# Patient Record
Sex: FEMALE | Race: WHITE | NOT HISPANIC OR LATINO | Employment: UNEMPLOYED | URBAN - METROPOLITAN AREA
[De-identification: names, ages, dates, MRNs, and addresses within clinical notes are randomized per-mention and may not be internally consistent; named-entity substitution may affect disease eponyms.]

---

## 2018-06-22 ENCOUNTER — TELEPHONE (OUTPATIENT)
Dept: FAMILY MEDICINE CLINIC | Facility: CLINIC | Age: 63
End: 2018-06-22

## 2018-10-10 ENCOUNTER — OFFICE VISIT (OUTPATIENT)
Dept: FAMILY MEDICINE CLINIC | Facility: CLINIC | Age: 63
End: 2018-10-10
Payer: COMMERCIAL

## 2018-10-10 VITALS
BODY MASS INDEX: 19.45 KG/M2 | HEIGHT: 61 IN | HEART RATE: 93 BPM | RESPIRATION RATE: 16 BRPM | DIASTOLIC BLOOD PRESSURE: 80 MMHG | SYSTOLIC BLOOD PRESSURE: 120 MMHG | WEIGHT: 103 LBS | OXYGEN SATURATION: 95 %

## 2018-10-10 DIAGNOSIS — Z23 NEED FOR INFLUENZA VACCINATION: ICD-10-CM

## 2018-10-10 DIAGNOSIS — F17.210 LIGHT CIGARETTE SMOKER: ICD-10-CM

## 2018-10-10 DIAGNOSIS — Z53.20 COLONOSCOPY REFUSED: ICD-10-CM

## 2018-10-10 DIAGNOSIS — Z00.01 ENCOUNTER FOR ROUTINE ADULT HEALTH EXAMINATION WITH ABNORMAL FINDINGS: Primary | ICD-10-CM

## 2018-10-10 DIAGNOSIS — E78.2 MIXED HYPERLIPIDEMIA: ICD-10-CM

## 2018-10-10 DIAGNOSIS — E03.9 ACQUIRED HYPOTHYROIDISM: ICD-10-CM

## 2018-10-10 DIAGNOSIS — E55.9 VITAMIN D DEFICIENCY: ICD-10-CM

## 2018-10-10 PROCEDURE — 99396 PREV VISIT EST AGE 40-64: CPT | Performed by: FAMILY MEDICINE

## 2018-10-10 PROCEDURE — 3725F SCREEN DEPRESSION PERFORMED: CPT | Performed by: FAMILY MEDICINE

## 2018-10-10 RX ORDER — CHOLECALCIFEROL (VITAMIN D3) 125 MCG
CAPSULE ORAL DAILY
COMMUNITY
Start: 2018-05-22

## 2018-10-10 NOTE — PATIENT INSTRUCTIONS

## 2018-10-10 NOTE — PROGRESS NOTES
[facility] HEALTH MAINTENANCE OFFICE VISIT  [facility]   SHAYE Palmer    NAME: Guille Greene  AGE: 61 y o  SEX: female  : 1955     DATE: 10/10/2018    Assessment and Plan     Problem List Items Addressed This Visit     Acquired hypothyroidism    Relevant Orders    Comprehensive metabolic panel    CBC and differential    Lipid panel    TSH, 3rd generation with Free T4 reflex    Light cigarette smoker     Chronic patient smoked the for more than 30 years half to 1 pack a day and smoking cessation discussed with the patient and she declined any assistant to help her to stop smoking         Mixed hyperlipidemia     Chronic ,fair control on current diet    Advised to maintain a low-fat low-cholesterol diet consult regarding potential comorbidity including cardiovascular disease consult regarding important weight loss           Relevant Orders    Comprehensive metabolic panel    CBC and differential    Lipid panel    TSH, 3rd generation with Free T4 reflex    Vitamin D deficiency     Chronic asymptomatic patient will continue with vitamin-D supplement 2000 International Units once a day         Relevant Orders    Comprehensive metabolic panel    CBC and differential    Lipid panel    TSH, 3rd generation with Free T4 reflex      Other Visit Diagnoses     Encounter for routine adult health examination with abnormal findings    -  Primary    Increased activity 30 min a day 5 day with 6 week healthy diet well hydration sunscreen discussed with the patient, she declineShingirix    Need for influenza vaccination        Patient declined flu shot    Relevant Orders    influenza vaccine, [id number], quadrivalent, recombinant, PF, 0 5 mL, for patients 18 yr+ (FLUBLOK)    Colonoscopy refused        Patient declined colonoscopy and she declined to have Hemoccult stool card            · Patient Counseling:   · Nutrition: Stressed importance of a well balanced diet, moderation of sodium/saturated fat, caloric balance and sufficient intake of fiber  · Exercise: Stressed the importance of regular exercise with a goal of 150 minutes per week  · Dental Health: Discussed daily flossing and brushing and regular dental visits     · Immunizations reviewed Patient declined flu shot declined  Tdap  · Discussed benefits of screening   Patient declined a mammogram patient declined colonoscopy  · Discussed the patient's BMI with her    The BMI is in the acceptable range           Chief Complaint     Chief Complaint   Patient presents with    Physical Exam     yearly        History of Present Illness     Patient is here for her annual exam deny any chest pain short of breath no palpitation no headache no blurred vision no weakness or lateralized of the symptom no abdomen pain no nausea vomiting or diarrhea no renal problem no rash no fever no change in the weight and no change in the mood the patient the she does not do exercise she does watch for which he eat she is smoker half to 1 pack a day for more than 30y ,refuse mammogram/colonoscopy        Well Adult Physical   Patient here for a comprehensive physical exam       Diet and Physical Activity  Diet: well balanced diet  Weight concerns: None, patient's BMI is between 18 5-24 9  Exercise: never      Depression Screen  PHQ-9 Depression Screening    PHQ-9:    Frequency of the following problems over the past two weeks:       Little interest or pleasure in doing things:  0 - not at all  Feeling down, depressed, or hopeless:  0 - not at all  PHQ-2 Score:  0          General Health  Hearing: Normal:  bilateral  Vision:  Patient does have vision problem follow with Ophthalmology  Dental: regular dental visits    Reproductive Health   patient is due for Pap smear was schedule her for appointment    The following portions of the patient's history were reviewed and updated as appropriate: allergies, current medications, past family history, past medical history, past social history, past surgical history and problem list     Review of Systems     Review of Systems   Constitutional: Negative for fatigue and fever  HENT: Negative for ear pain, sinus pain, sinus pressure and sore throat  Eyes: Negative for pain and redness  Respiratory: Negative for cough, chest tightness and shortness of breath  Cardiovascular: Negative for chest pain, palpitations and leg swelling  Gastrointestinal: Negative for abdominal pain, blood in stool, constipation, diarrhea and nausea  Genitourinary: Negative for flank pain, frequency and hematuria  Musculoskeletal: Negative for back pain and joint swelling  Skin: Negative for rash  Neurological: Negative for dizziness, numbness and headaches  Hematological: Does not bruise/bleed easily  Psychiatric/Behavioral: Negative for agitation and behavioral problems  Past Medical History     Past Medical History:   Diagnosis Date    Wrist fracture, right        Past Surgical History     Past Surgical History:   Procedure Laterality Date     SECTION      x2    TONSILLECTOMY         Social History     Social History     Social History    Marital status:      Spouse name: N/A    Number of children: N/A    Years of education: N/A     Social History Main Topics    Smoking status: Current Every Day Smoker     Packs/day: 0 25     Years: 10 00     Types: Cigarettes    Smokeless tobacco: Never Used      Comment: passive smoke exposure    Alcohol use Yes      Comment: occasional     Drug use: Unknown    Sexual activity: Not Asked     Other Topics Concern    None     Social History Narrative    None       Family History     History reviewed  No pertinent family history      Current Medications       Current Outpatient Prescriptions:     Cholecalciferol (VITAMIN D3) 2000 units TABS, daily, Disp: , Rfl:      Allergies     Allergies   Allergen Reactions    Codeine Anaphylaxis       Objective /80 (BP Location: Left arm, Patient Position: Sitting, Cuff Size: Standard)   Pulse 93   Resp 16   Ht 5' 1" (1 549 m)   Wt 46 7 kg (103 lb)   SpO2 95%   BMI 19 46 kg/m²      Physical Exam   Constitutional: She is oriented to person, place, and time  She appears well-developed and well-nourished  HENT:   Head: Normocephalic  Right Ear: External ear normal    Left Ear: External ear normal    Eyes: Conjunctivae and EOM are normal  Right eye exhibits no discharge  Left eye exhibits no discharge  Neck: No JVD present  Cardiovascular: Normal rate, regular rhythm and normal heart sounds  Exam reveals no gallop  No murmur heard  Pulmonary/Chest: Effort normal  No respiratory distress  She has no wheezes  She has no rales  She exhibits no tenderness  Abdominal: She exhibits no mass  There is no tenderness  There is no rebound  Musculoskeletal: She exhibits no edema or tenderness  Neurological: She is alert and oriented to person, place, and time  Skin: No rash noted  No erythema  Health Maintenance     Health Maintenance   Topic Date Due    Pneumococcal PPSV23 Medium Risk Adult (1 of 1 - PPSV23) 01/10/1974    DTaP,Tdap,and Td Vaccines (1 - Tdap) 01/10/1976    INFLUENZA VACCINE  07/01/2018    Depression Screening PHQ  10/10/2019     There is no immunization history for the selected administration types on file for this patient      Estephania Clark MD  [address] [place] [address]

## 2018-10-11 NOTE — ASSESSMENT & PLAN NOTE
Chronic ,fair control on current diet    Advised to maintain a low-fat low-cholesterol diet consult regarding potential comorbidity including cardiovascular disease consult regarding important weight loss

## 2018-10-11 NOTE — ASSESSMENT & PLAN NOTE
Chronic patient smoked the for more than 30 years half to 1 pack a day and smoking cessation discussed with the patient and she declined any assistant to help her to stop smoking

## 2018-10-11 NOTE — ASSESSMENT & PLAN NOTE
Chronic asymptomatic patient will continue with vitamin-D supplement 2000 International Units once a day

## 2019-02-15 ENCOUNTER — OFFICE VISIT (OUTPATIENT)
Dept: FAMILY MEDICINE CLINIC | Facility: CLINIC | Age: 64
End: 2019-02-15
Payer: COMMERCIAL

## 2019-02-15 VITALS
BODY MASS INDEX: 19.69 KG/M2 | HEIGHT: 62 IN | WEIGHT: 107 LBS | SYSTOLIC BLOOD PRESSURE: 136 MMHG | DIASTOLIC BLOOD PRESSURE: 90 MMHG | TEMPERATURE: 97.9 F | HEART RATE: 79 BPM | OXYGEN SATURATION: 95 %

## 2019-02-15 DIAGNOSIS — E87.5 HYPERKALEMIA: ICD-10-CM

## 2019-02-15 DIAGNOSIS — Z12.11 SCREENING FOR MALIGNANT NEOPLASM OF COLON: ICD-10-CM

## 2019-02-15 DIAGNOSIS — E55.9 VITAMIN D DEFICIENCY: ICD-10-CM

## 2019-02-15 DIAGNOSIS — Z11.59 NEED FOR HEPATITIS C SCREENING TEST: ICD-10-CM

## 2019-02-15 DIAGNOSIS — E03.9 ACQUIRED HYPOTHYROIDISM: ICD-10-CM

## 2019-02-15 DIAGNOSIS — E87.6 HYPOKALEMIA: ICD-10-CM

## 2019-02-15 DIAGNOSIS — E78.2 MIXED HYPERLIPIDEMIA: Primary | ICD-10-CM

## 2019-02-15 PROCEDURE — 3008F BODY MASS INDEX DOCD: CPT | Performed by: FAMILY MEDICINE

## 2019-02-15 PROCEDURE — 99214 OFFICE O/P EST MOD 30 MIN: CPT | Performed by: FAMILY MEDICINE

## 2019-02-17 PROBLEM — E87.6 HYPOKALEMIA: Status: ACTIVE | Noted: 2019-02-17

## 2019-02-17 PROBLEM — E87.5 HYPERKALEMIA: Status: ACTIVE | Noted: 2019-02-17

## 2019-02-17 NOTE — ASSESSMENT & PLAN NOTE
New finding on the recent blood work plan to repeat potassium level to rule out lab error patient is asymptomatic discussed the plan with the patient

## 2019-02-17 NOTE — PROGRESS NOTES
Subjective:   Chief Complaint   Patient presents with    Follow-up     chronic conditions        Patient ID: Hoang Gibson is a 59 y o  female  Patient here follow-up with a chronic condition patient's history of hyperlipidemia will control with the low-fat diet she deny any chest pain short of breath no palpitation no headache no blurred vision no weakness or lateralized of the symptom patient's history of vitamin-D deficiency she is on vitamin-D supplement asymptomatic no bone pain no joint pain no muscle pain no fatigue and patient was history of for hypothyroidism also asymptomatic there is no dryness in the skin no fatigue no mood change no intolerance to the temperature and patient not on any medication her TSH well-controlled her current blood work discussed with the patient show her potassium level is elevated hyperkalemia the patient asymptomatic      The following portions of the patient's history were reviewed and updated as appropriate: allergies, current medications, past family history, past medical history, past social history, past surgical history and problem list     Review of Systems   Constitutional: Negative for fatigue and fever  HENT: Negative for ear pain, sinus pressure, sinus pain and sore throat  Eyes: Negative for pain and redness  Respiratory: Negative for cough, chest tightness and shortness of breath  Cardiovascular: Negative for chest pain, palpitations and leg swelling  Gastrointestinal: Negative for abdominal pain, blood in stool, constipation, diarrhea and nausea  Genitourinary: Negative for flank pain, frequency and hematuria  Musculoskeletal: Negative for back pain and joint swelling  Skin: Negative for rash  Neurological: Negative for dizziness, numbness and headaches  Hematological: Does not bruise/bleed easily           Objective:  Vitals:    02/15/19 1538   BP: 136/90   Pulse: 79   Temp: 97 9 °F (36 6 °C)   TempSrc: Oral   SpO2: 95%   Weight: 48 5 kg (107 lb)   Height: 5' 1 5" (1 562 m)      Physical Exam   Constitutional: She is oriented to person, place, and time  She appears well-developed and well-nourished  HENT:   Head: Normocephalic  Right Ear: External ear normal    Left Ear: External ear normal    Eyes: Conjunctivae and EOM are normal  Right eye exhibits no discharge  Left eye exhibits no discharge  Neck: No JVD present  Cardiovascular: Normal rate, regular rhythm and normal heart sounds  Exam reveals no gallop  No murmur heard  Pulmonary/Chest: Effort normal  No respiratory distress  She has no wheezes  She has no rales  She exhibits no tenderness  Abdominal: She exhibits no mass  There is no tenderness  There is no rebound  Musculoskeletal: She exhibits no edema or tenderness  Neurological: She is alert and oriented to person, place, and time  Skin: No rash noted  No erythema  Assessment/Plan:    Acquired hypothyroidism  Chronic asymptomatic fair control not on any medication    Vitamin D deficiency  Chronic asymptomatic fair control patient on multi vitamin supplement we encouraged patient tongue increase her vitamin-D rich diet    Mixed hyperlipidemia  Chronic asymptomatic fair control with the low-fat diet we encouraged patient to watch for low fat diet increase physical activity    Hyperkalemia   New finding on the recent blood work plan to repeat potassium level to rule out lab error patient is asymptomatic discussed the plan with the patient       Diagnoses and all orders for this visit:    Mixed hyperlipidemia  -     CBC and differential; Future  -     Comprehensive metabolic panel; Future  -     Lipid Panel with Direct LDL reflex; Future  -     TSH, 3rd generation with Free T4 reflex; Future    Hypokalemia  -     Cancel: Potassium; Future  -     CBC and differential; Future  -     Comprehensive metabolic panel; Future  -     Lipid Panel with Direct LDL reflex;  Future  -     TSH, 3rd generation with Free T4 reflex; Future    Acquired hypothyroidism  -     CBC and differential; Future  -     Comprehensive metabolic panel; Future  -     Lipid Panel with Direct LDL reflex; Future  -     TSH, 3rd generation with Free T4 reflex; Future    Vitamin D deficiency  -     CBC and differential; Future  -     Comprehensive metabolic panel; Future  -     Lipid Panel with Direct LDL reflex; Future  -     TSH, 3rd generation with Free T4 reflex; Future    Need for hepatitis C screening test  -     Hepatitis C antibody; Future    Screening for malignant neoplasm of colon  -     Occult Blood, Fecal Immunochemical; Future    Hyperkalemia  -     Potassium; Future    Other orders  -     Cancel: Ambulatory referral to Gastroenterology;  Future

## 2019-02-17 NOTE — ASSESSMENT & PLAN NOTE
new onset finding on the recent blood work plan to repeat potassium level to rule out lab error patient is asymptomatic discussed the plan with the patient

## 2019-02-17 NOTE — ASSESSMENT & PLAN NOTE
Chronic asymptomatic fair control with the low-fat diet we encouraged patient to watch for low fat diet increase physical activity

## 2019-02-17 NOTE — ASSESSMENT & PLAN NOTE
Chronic asymptomatic fair control patient on multi vitamin supplement we encouraged patient tongue increase her vitamin-D rich diet

## 2019-04-23 DIAGNOSIS — Z12.31 ENCOUNTER FOR SCREENING MAMMOGRAM FOR BREAST CANCER: Primary | ICD-10-CM

## 2019-05-07 ENCOUNTER — APPOINTMENT (OUTPATIENT)
Dept: LAB | Facility: HOSPITAL | Age: 64
End: 2019-05-07
Payer: COMMERCIAL

## 2019-05-07 DIAGNOSIS — Z12.11 SCREENING FOR MALIGNANT NEOPLASM OF COLON: ICD-10-CM

## 2019-05-07 LAB — HEMOCCULT STL QL IA: NEGATIVE

## 2019-05-07 PROCEDURE — G0328 FECAL BLOOD SCRN IMMUNOASSAY: HCPCS

## 2019-07-12 ENCOUNTER — OFFICE VISIT (OUTPATIENT)
Dept: FAMILY MEDICINE CLINIC | Facility: CLINIC | Age: 64
End: 2019-07-12
Payer: COMMERCIAL

## 2019-07-12 VITALS
HEART RATE: 80 BPM | RESPIRATION RATE: 16 BRPM | TEMPERATURE: 99.2 F | WEIGHT: 101 LBS | BODY MASS INDEX: 18.58 KG/M2 | OXYGEN SATURATION: 96 % | SYSTOLIC BLOOD PRESSURE: 162 MMHG | HEIGHT: 62 IN | DIASTOLIC BLOOD PRESSURE: 90 MMHG

## 2019-07-12 DIAGNOSIS — F17.210 LIGHT CIGARETTE SMOKER: ICD-10-CM

## 2019-07-12 DIAGNOSIS — E03.9 ACQUIRED HYPOTHYROIDISM: ICD-10-CM

## 2019-07-12 DIAGNOSIS — E78.2 MIXED HYPERLIPIDEMIA: ICD-10-CM

## 2019-07-12 DIAGNOSIS — R03.0 ELEVATED BP WITHOUT DIAGNOSIS OF HYPERTENSION: Primary | ICD-10-CM

## 2019-07-12 DIAGNOSIS — E55.9 VITAMIN D DEFICIENCY: ICD-10-CM

## 2019-07-12 PROCEDURE — 99214 OFFICE O/P EST MOD 30 MIN: CPT | Performed by: FAMILY MEDICINE

## 2019-07-12 PROCEDURE — 3008F BODY MASS INDEX DOCD: CPT | Performed by: FAMILY MEDICINE

## 2019-07-12 PROCEDURE — 3725F SCREEN DEPRESSION PERFORMED: CPT | Performed by: FAMILY MEDICINE

## 2019-07-12 PROCEDURE — 4004F PT TOBACCO SCREEN RCVD TLK: CPT | Performed by: FAMILY MEDICINE

## 2019-07-12 NOTE — ASSESSMENT & PLAN NOTE
Chronic asymptomatic ,uncontrolled ,patoent continue to smoke less than 5 cigarette /day   Patient not ready to stop smoking aware of side effect

## 2019-07-12 NOTE — PROGRESS NOTES
Subjective:   Chief Complaint   Patient presents with    Follow-up     chronic conditions        Patient ID: Dennis Layton is a 59 y o  female  Patient and office follow-up with a chronic condition patient's history of hyperlipidemia try to controlled with the low carb diet deny any chest pain short of breath no palpitation no TIA symptom patient history of vitamin-D deficiency deny any bone pain joint pain no muscle pain deny any fatigue patient was history of hypothyroidism asymptomatic deny any heat or cold intolerance mood stable patient continued to smoke but she try to cut down on the smoking down to 5 cigarettes a day  Recent blood work discussed with the patient      The following portions of the patient's history were reviewed and updated as appropriate: allergies, current medications, past family history, past medical history, past social history, past surgical history and problem list     Review of Systems   Constitutional: Negative for fatigue and fever  HENT: Negative for ear pain, sinus pressure, sinus pain and sore throat  Eyes: Negative for pain and redness  Respiratory: Negative for cough, chest tightness and shortness of breath  Cardiovascular: Negative for chest pain, palpitations and leg swelling  Gastrointestinal: Negative for abdominal pain, blood in stool, constipation, diarrhea and nausea  Genitourinary: Negative for flank pain, frequency and hematuria  Musculoskeletal: Negative for back pain and joint swelling  Skin: Negative for rash  Neurological: Negative for dizziness, numbness and headaches  Hematological: Does not bruise/bleed easily           Objective:  Vitals:    07/12/19 1503   BP: 162/90   BP Location: Left arm   Patient Position: Sitting   Cuff Size: Adult   Pulse: 80   Resp: 16   Temp: 99 2 °F (37 3 °C)   TempSrc: Tympanic   SpO2: 96%   Weight: 45 8 kg (101 lb)   Height: 5' 1 5" (1 562 m)      Physical Exam   Constitutional: She is oriented to person, place, and time  She appears well-developed and well-nourished  HENT:   Head: Normocephalic  Right Ear: External ear normal    Left Ear: External ear normal    Eyes: Conjunctivae and EOM are normal  Right eye exhibits no discharge  Left eye exhibits no discharge  Neck: No JVD present  Cardiovascular: Normal rate, regular rhythm and normal heart sounds  Exam reveals no gallop  No murmur heard  Pulmonary/Chest: Effort normal  No respiratory distress  She has no wheezes  She has no rales  She exhibits no tenderness  Abdominal: She exhibits no mass  There is no tenderness  There is no rebound  Musculoskeletal: She exhibits no edema or tenderness  Neurological: She is alert and oriented to person, place, and time  Skin: No rash noted  No erythema  Assessment/Plan:    Mixed hyperlipidemia  Chronic Asymptomatic patient is not candidate for statin continue low carb diet     Light cigarette smoker  Chronic asymptomatic ,uncontrolled ,patoent continue to smoke less than 5 cigarette /day   Patient not ready to stop smoking aware of side effect    Vitamin D deficiency  Chronic asymptomatic continue Vit D supplement     Acquired hypothyroidism  Chronic Asymptomatic fair controlled without med       Diagnoses and all orders for this visit:    Elevated BP without diagnosis of hypertension  -     Basic metabolic panel; Future    Acquired hypothyroidism  -     TSH, 3rd generation with Free T4 reflex; Future    Mixed hyperlipidemia  -     Lipid panel; Future    Vitamin D deficiency  -     CBC and differential; Future    Light cigarette smoker    Other orders  -     Cancel: Ambulatory referral to Gynecology;  Future

## 2019-11-20 ENCOUNTER — ANNUAL EXAM (OUTPATIENT)
Dept: FAMILY MEDICINE CLINIC | Facility: CLINIC | Age: 64
End: 2019-11-20
Payer: COMMERCIAL

## 2019-11-20 VITALS
TEMPERATURE: 98.4 F | SYSTOLIC BLOOD PRESSURE: 118 MMHG | HEIGHT: 62 IN | DIASTOLIC BLOOD PRESSURE: 78 MMHG | HEART RATE: 80 BPM | OXYGEN SATURATION: 95 % | BODY MASS INDEX: 18.22 KG/M2 | WEIGHT: 99 LBS | RESPIRATION RATE: 16 BRPM

## 2019-11-20 DIAGNOSIS — M85.88 OSTEOPENIA OF OTHER SITE: ICD-10-CM

## 2019-11-20 DIAGNOSIS — E55.9 VITAMIN D DEFICIENCY: ICD-10-CM

## 2019-11-20 DIAGNOSIS — E03.9 ACQUIRED HYPOTHYROIDISM: ICD-10-CM

## 2019-11-20 DIAGNOSIS — E78.2 MIXED HYPERLIPIDEMIA: ICD-10-CM

## 2019-11-20 DIAGNOSIS — Z01.419 ROUTINE GYNECOLOGICAL EXAMINATION: Primary | ICD-10-CM

## 2019-11-20 PROCEDURE — G0145 SCR C/V CYTO,THINLAYER,RESCR: HCPCS | Performed by: FAMILY MEDICINE

## 2019-11-20 PROCEDURE — 99396 PREV VISIT EST AGE 40-64: CPT | Performed by: FAMILY MEDICINE

## 2019-11-20 PROCEDURE — 87624 HPV HI-RISK TYP POOLED RSLT: CPT | Performed by: FAMILY MEDICINE

## 2019-11-20 PROCEDURE — 99214 OFFICE O/P EST MOD 30 MIN: CPT | Performed by: FAMILY MEDICINE

## 2019-11-20 NOTE — PROGRESS NOTES
Subjective:   Chief Complaint   Patient presents with    Gynecologic Exam     routine gyn exam         Patient ID: Valentin Butler is a 59 y o  female  Patient here for an follow-up with a chronic condition for her Pap smear  The patient was history of osteopenia she does have a low BMI increase her risk for osteoporosis patient is due for DEXA scan but she decline it patient deny any bone pain joint pain no muscle pain patient's history of vitamin-D deficiency and she has deny any fatigue no fall she has been taking vitamin-D supplement patient's history of hyperlipidemia try to controlled with the low-fat diet deny any chest pain short of breath no palpitation no TIA symptom patient was history of hypothyroidism and she try to trick or bolus supplement over-the-counter his TSH is stable deny any mood changes heat or cold intolerance  Recent blood work discussed with the patient      The following portions of the patient's history were reviewed and updated as appropriate: allergies, current medications, past family history, past medical history, past social history, past surgical history and problem list     Review of Systems   Constitutional: Negative for fatigue and fever  HENT: Negative for ear pain, sinus pressure, sinus pain and sore throat  Eyes: Negative for pain and redness  Respiratory: Negative for cough, chest tightness and shortness of breath  Cardiovascular: Negative for chest pain, palpitations and leg swelling  Gastrointestinal: Negative for abdominal pain, blood in stool, constipation, diarrhea and nausea  Genitourinary: Negative for flank pain, frequency and hematuria  Musculoskeletal: Negative for back pain and joint swelling  Skin: Negative for rash  Neurological: Negative for dizziness, numbness and headaches  Hematological: Does not bruise/bleed easily  Tobacco Cessation Counseling: Tobacco cessation counseling was provided   The patient is sincerely urged to quit consumption of tobacco  She is not ready to quit tobacco  Medication options discussed  Patient refused medication  Objective:  Vitals:    11/20/19 1454   BP: 118/78   BP Location: Left arm   Patient Position: Sitting   Cuff Size: Adult   Pulse: 80   Resp: 16   Temp: 98 4 °F (36 9 °C)   TempSrc: Tympanic   SpO2: 95%   Weight: 44 9 kg (99 lb)   Height: 5' 1 5" (1 562 m)      Physical Exam   Constitutional: She is oriented to person, place, and time  She appears well-developed and well-nourished  HENT:   Head: Normocephalic  Right Ear: External ear normal    Left Ear: External ear normal    Eyes: Conjunctivae and EOM are normal  Right eye exhibits no discharge  Left eye exhibits no discharge  Neck: No JVD present  Cardiovascular: Normal rate, regular rhythm and normal heart sounds  Exam reveals no gallop  No murmur heard  Pulmonary/Chest: Effort normal  No respiratory distress  She has no wheezes  She has no rales  She exhibits no tenderness  Abdominal: She exhibits no mass  There is no tenderness  There is no rebound  Musculoskeletal: She exhibits no edema or tenderness  Neurological: She is alert and oriented to person, place, and time  Skin: No rash noted  No erythema           Assessment/Plan:    Osteopenia  Chronic patient had the risk for osteoporosis and she is due for DEXA scan but she decline it she just want to treated with herbal and vitamin D supplement    Mixed hyperlipidemia  Chronic asymptomatic fair control with the low-fat diet encouraged patient to continue    Vitamin D deficiency  Chronic asymptomatic fair control continue vitamin-D supplement vitamin-D rich diet discussed with the patient    Acquired hypothyroidism  Chronic asymptomatic fair control not on any medication    Body mass index (BMI) less than 19 in adult  Low BMI increased risk for osteoporosis and the a discussed the patient increase oral intake and protein supplement and important gain weight    Routine gynecological examination  Pap is done today       Diagnoses and all orders for this visit:    Routine gynecological examination  -     Liquid-based pap, screening  -     HPV High Risk; Future  -     HPV High Risk    Body mass index (BMI) less than 19 in adult  -     CBC and differential; Future  -     Basic metabolic panel; Future  -     Lipid Panel with Direct LDL reflex; Future  -     TSH, 3rd generation with Free T4 reflex; Future    Vitamin D deficiency  -     CBC and differential; Future  -     Basic metabolic panel; Future  -     Lipid Panel with Direct LDL reflex; Future  -     TSH, 3rd generation with Free T4 reflex; Future    Acquired hypothyroidism  -     CBC and differential; Future  -     Basic metabolic panel; Future  -     Lipid Panel with Direct LDL reflex; Future  -     TSH, 3rd generation with Free T4 reflex; Future    Osteopenia of other site    Mixed hyperlipidemia  -     CBC and differential; Future  -     Basic metabolic panel; Future  -     Lipid Panel with Direct LDL reflex; Future  -     TSH, 3rd generation with Free T4 reflex; Future    Other orders  -     Menaquinone-7 (VITAMIN K2 PO);  Take by mouth

## 2019-11-20 NOTE — PROGRESS NOTES
Subjective    Breanna Jacobs is a 59 y o  female here for a routine gynecologic exam       Current complaints:   Patient is here for her gyn exam deny any abdomen pain no flank pain no vaginal bleeding no vaginal discharge she is postmenopausal     Gynecologic History  No LMP recorded  Patient is postmenopausal   Contraception: none  Last Pap: unknown   Results were: normal   Last mammogram:2015  Results were: normal  Patient is due for mammogram she decline it  Last colonoscopy: FIT TEST   Results were: normal     Obstetric History        The following portions of the patient's history were reviewed and updated as appropriate: allergies, current medications, past family history, past medical history, past social history, past surgical history and problem list     Review of Systems   Constitutional: Negative for chills, fatigue and fever  HENT: Negative for sore throat  Respiratory: Negative for cough and shortness of breath  Cardiovascular: Negative for chest pain and leg swelling  Gastrointestinal: Negative for abdominal distention, abdominal pain, blood in stool, constipation, diarrhea and rectal pain  Endocrine: Negative for heat intolerance  Genitourinary: Negative for decreased urine volume, difficulty urinating, dysuria, frequency, genital sores, hematuria, menstrual problem, pelvic pain, vaginal bleeding, vaginal discharge and vaginal pain  Musculoskeletal: Negative for back pain and gait problem  Neurological: Negative for tremors, numbness and headaches  Objective    Physical Exam   Constitutional: She is oriented to person, place, and time  She appears well-developed  HENT:   Right Ear: External ear normal    Left Ear: External ear normal    Eyes: Pupils are equal, round, and reactive to light  EOM are normal    Neck: No thyromegaly present  Cardiovascular: Normal rate, regular rhythm and normal heart sounds  Exam reveals no friction rub     No murmur heard   Pulmonary/Chest: She has no wheezes  She has no rales  She exhibits no tenderness  Abdominal: She exhibits no distension and no mass  There is no tenderness  No hernia  Hernia confirmed negative in the right inguinal area and confirmed negative in the left inguinal area  Genitourinary: Vagina normal and uterus normal  There is no rash on the right labia  There is no rash on the left labia  Cervix exhibits no motion tenderness  Right adnexum displays no mass and no tenderness  Left adnexum displays no mass and no tenderness  No tenderness or bleeding in the vagina  No foreign body in the vagina  No vaginal discharge found  Musculoskeletal: Normal range of motion  She exhibits no edema  Lymphadenopathy:        Right: No inguinal adenopathy present  Left: No inguinal adenopathy present  Neurological: She is alert and oriented to person, place, and time  Skin: Skin is warm  Assessment     Healthy female exam       Encounter Diagnosis     ICD-10-CM    1  Routine gynecological examination Z01 419 Liquid-based pap, screening     HPV High Risk     HPV High Risk   2  Body mass index (BMI) less than 19 in adult Z68 1 CBC and differential     Basic metabolic panel     Lipid Panel with Direct LDL reflex     TSH, 3rd generation with Free T4 reflex   3  Vitamin D deficiency E55 9 CBC and differential     Basic metabolic panel     Lipid Panel with Direct LDL reflex     TSH, 3rd generation with Free T4 reflex   4  Acquired hypothyroidism E03 9 CBC and differential     Basic metabolic panel     Lipid Panel with Direct LDL reflex     TSH, 3rd generation with Free T4 reflex   5  Osteopenia of other site M85 88    6  Mixed hyperlipidemia E78 2 CBC and differential     Basic metabolic panel     Lipid Panel with Direct LDL reflex     TSH, 3rd generation with Free T4 reflex     Plan     Education reviewed: calcium supplements, depression evaluation, low fat, low cholesterol diet and self breast exams  BMI Counseling: Body mass index is 18 4 kg/m²  The BMI is below normal  Patient was advised to gain weight  Dietary education for weight gain was provided to the patient today

## 2019-11-21 LAB
HPV HR 12 DNA CVX QL NAA+PROBE: NEGATIVE
HPV16 DNA CVX QL NAA+PROBE: NEGATIVE
HPV18 DNA CVX QL NAA+PROBE: NEGATIVE

## 2019-11-23 PROBLEM — Z01.419 ROUTINE GYNECOLOGICAL EXAMINATION: Status: ACTIVE | Noted: 2019-11-20

## 2019-11-23 PROBLEM — Z01.419 ROUTINE GYNECOLOGICAL EXAMINATION: Status: ACTIVE | Noted: 2019-11-23

## 2019-11-23 NOTE — ASSESSMENT & PLAN NOTE
Low BMI increased risk for osteoporosis and the a discussed the patient increase oral intake and protein supplement and important gain weight

## 2019-11-23 NOTE — ASSESSMENT & PLAN NOTE
Chronic patient had the risk for osteoporosis and she is due for DEXA scan but she decline it she just want to treated with herbal and vitamin D supplement

## 2019-11-26 ENCOUNTER — TELEPHONE (OUTPATIENT)
Dept: FAMILY MEDICINE CLINIC | Facility: CLINIC | Age: 64
End: 2019-11-26

## 2019-11-26 LAB
LAB AP GYN PRIMARY INTERPRETATION: NORMAL
Lab: NORMAL
PATH INTERP SPEC-IMP: NORMAL

## 2020-03-16 ENCOUNTER — TELEPHONE (OUTPATIENT)
Dept: FAMILY MEDICINE CLINIC | Facility: CLINIC | Age: 65
End: 2020-03-16

## 2020-03-16 DIAGNOSIS — E87.5 HYPERKALEMIA: Primary | ICD-10-CM

## 2020-03-16 NOTE — TELEPHONE ENCOUNTER
----- Message from Yousif Malagon MD sent at 3/16/2020 11:22 AM EDT -----  To repeat potassium level diagnosis the hyperkalemia

## 2020-03-16 NOTE — TELEPHONE ENCOUNTER
Patient will  script to repeat test order entered she goes to [facility] in [facility]

## 2020-03-19 ENCOUNTER — OFFICE VISIT (OUTPATIENT)
Dept: FAMILY MEDICINE CLINIC | Facility: CLINIC | Age: 65
End: 2020-03-19
Payer: COMMERCIAL

## 2020-03-19 VITALS
BODY MASS INDEX: 17.66 KG/M2 | WEIGHT: 96 LBS | TEMPERATURE: 97.5 F | OXYGEN SATURATION: 96 % | DIASTOLIC BLOOD PRESSURE: 74 MMHG | RESPIRATION RATE: 16 BRPM | HEIGHT: 62 IN | SYSTOLIC BLOOD PRESSURE: 110 MMHG | HEART RATE: 83 BPM

## 2020-03-19 DIAGNOSIS — M85.88 OSTEOPENIA OF OTHER SITE: ICD-10-CM

## 2020-03-19 DIAGNOSIS — E78.2 MIXED HYPERLIPIDEMIA: ICD-10-CM

## 2020-03-19 DIAGNOSIS — E87.5 HYPERKALEMIA: ICD-10-CM

## 2020-03-19 DIAGNOSIS — Z28.21 IMMUNIZATION REFUSED: ICD-10-CM

## 2020-03-19 DIAGNOSIS — E55.9 VITAMIN D DEFICIENCY: ICD-10-CM

## 2020-03-19 DIAGNOSIS — F17.210 LIGHT CIGARETTE SMOKER: ICD-10-CM

## 2020-03-19 DIAGNOSIS — Z00.00 WELCOME TO MEDICARE PREVENTIVE VISIT: Primary | ICD-10-CM

## 2020-03-19 PROCEDURE — G0402 INITIAL PREVENTIVE EXAM: HCPCS | Performed by: FAMILY MEDICINE

## 2020-03-19 PROCEDURE — 3008F BODY MASS INDEX DOCD: CPT | Performed by: FAMILY MEDICINE

## 2020-03-19 PROCEDURE — 4004F PT TOBACCO SCREEN RCVD TLK: CPT | Performed by: FAMILY MEDICINE

## 2020-03-19 PROCEDURE — 99214 OFFICE O/P EST MOD 30 MIN: CPT | Performed by: FAMILY MEDICINE

## 2020-03-19 NOTE — PROGRESS NOTES
Assessment and Plan:     Problem List Items Addressed This Visit        Musculoskeletal and Integument    Osteopenia     Patient is due for DEXA scan and she decline a to have one            Other    Light cigarette smoker     A chronic uncontrolled patient try to cut down number of the cigarette she is not ready to quit smoking yet patient aware of the complication of smoking         Mixed hyperlipidemia     Chronic asymptomatic discussed with the patient low-fat diet         Relevant Orders    CBC and differential    Basic metabolic panel    Lipid Panel with Direct LDL reflex    TSH, 3rd generation with Free T4 reflex    Vitamin D deficiency     Chronic asymptomatic continue vitamin-D supplement vitamin-D rich diet discussed with the patient         Relevant Orders    CBC and differential    Basic metabolic panel    Lipid Panel with Direct LDL reflex    TSH, 3rd generation with Free T4 reflex    Hyperkalemia     Asymptomatic we discussed the patient low potassium diet will recheck a level in 1 week         Relevant Orders    CBC and differential    Basic metabolic panel    Lipid Panel with Direct LDL reflex    TSH, 3rd generation with Free T4 reflex    Potassium    Body mass index (BMI) less than 19 in adult     Discussed with the patient important gain weight increase calorie intake         Relevant Orders    CBC and differential    Basic metabolic panel    Lipid Panel with Direct LDL reflex    TSH, 3rd generation with Free T4 reflex    Welcome to Medicare preventive visit - Primary     Advice and education were given regarding nutrition, aerobic exercises, weight bearing exercises, cardiovascular risk reduction, fall risk reduction, and age appropriate supplements  The patient was counseled regarding instructions for management, risk factor reductions, prognosis, risks and benefits of treatment options, patient and family education, and importance of compliance with treatment                Immunization refused     Patient refuse recommended immunization             BMI Counseling: Body mass index is 17 85 kg/m²  The BMI is below normal  Patient advised to gain weight  Tobacco Cessation Counseling: Tobacco cessation counseling was provided  The patient is sincerely urged to quit consumption of tobacco  She is ready to quit tobacco  Medication options discussed  Patient agreed to medication  Preventive health issues were discussed with patient, and age appropriate screening tests were ordered as noted in patient's After Visit Summary  Personalized health advice and appropriate referrals for health education or preventive services given if needed, as noted in patient's After Visit Summary  History of Present Illness:     Patient presents for [place] to Medicare visit  Patient Care Team:  Girish Casas MD as PCP - General (Family Medicine)     Review of Systems:     Review of Systems   Constitutional: Negative for fatigue and fever  HENT: Negative for ear pain, sinus pressure, sinus pain and sore throat  Eyes: Negative for pain and redness  Respiratory: Negative for cough, chest tightness and shortness of breath  Cardiovascular: Negative for chest pain, palpitations and leg swelling  Gastrointestinal: Negative for abdominal pain, blood in stool, constipation, diarrhea and nausea  Endocrine: Negative for heat intolerance and polydipsia  Genitourinary: Negative for flank pain, frequency and hematuria  Musculoskeletal: Negative for back pain and joint swelling  Skin: Negative for rash  Neurological: Negative for dizziness, numbness and headaches  Hematological: Does not bruise/bleed easily  Psychiatric/Behavioral: Negative for agitation        Problem List:     Patient Active Problem List   Diagnosis    Acquired hypothyroidism    Carpal tunnel syndrome    Light cigarette smoker    Mixed hyperlipidemia    Osteopenia    Vitamin D deficiency    Hyperkalemia    Elevated BP without diagnosis of hypertension    Body mass index (BMI) less than 19 in adult    Routine gynecological examination    Welcome to Medicare preventive visit    Immunization refused      Past Medical and Surgical History:     Past Medical History:   Diagnosis Date    Disease of thyroid gland     Wrist fracture, right      Past Surgical History:   Procedure Laterality Date     SECTION      x2    TONSILLECTOMY        Family History:     History reviewed  No pertinent family history     Social History:        Social History     Socioeconomic History    Marital status: /Civil Union     Spouse name: None    Number of children: None    Years of education: None    Highest education level: None   Occupational History    None   Social Needs    Financial resource strain: Not hard at all   [employer] insecurity:     Worry: Never true     Inability: Never true    Transportation needs:     Medical: No     Non-medical: No   Tobacco Use    Smoking status: Current Every Day Smoker     Packs/day: 0 00     Years: 10 00     Pack years: 0 00     Types: Cigarettes    Smokeless tobacco: Current User    Tobacco comment: passive smoke exposure   Substance and Sexual Activity    Alcohol use: Not Currently     Frequency: Never     Binge frequency: Never    Drug use: Never    Sexual activity: Yes     Partners: Male   Lifestyle    Physical activity:     Days per week: 0 days     Minutes per session: 0 min    Stress: Not at all   Relationships    Social connections:     Talks on phone: More than three times a week     Gets together: More than three times a week     Attends Church service: Never     Active member of club or organization: No     Attends meetings of clubs or organizations: Never     Relationship status:     Intimate partner violence:     Fear of current or ex partner: No     Emotionally abused: No     Physically abused: No     Forced sexual activity: No   Other Topics Concern    None Social History Narrative    None      Medications and Allergies:     Current Outpatient Medications   Medication Sig Dispense Refill    Cholecalciferol (VITAMIN D3) 2000 units TABS daily      Menaquinone-7 (VITAMIN K2 PO) Take by mouth       No current facility-administered medications for this visit  Allergies   Allergen Reactions    Codeine Anaphylaxis      Immunizations: There is no immunization history for the selected administration types on file for this patient  Health Maintenance:         Topic Date Due    MAMMOGRAM  07/30/2017    CRC Screening: FOBTx3/FIT  05/07/2020    Cervical Cancer Screening  11/20/2022    Hepatitis C Screening  Completed         Topic Date Due    Influenza Vaccine  07/01/2019    Pneumococcal Vaccine: 65+ Years (1 of 2 - PCV13) 01/10/2020      Medicare Screening Tests and Risk Assessments:     Reg Agnes is here for her Welcome to Medicare visit  Health Risk Assessment:   Patient rates overall health as very good  Patient feels that their physical health rating is same  Eyesight was rated as same  Hearing was rated as same  Patient feels that their emotional and mental health rating is same  Pain experienced in the last 7 days has been none  Patient states that she has experienced no weight loss or gain in last 6 months  Depression Screening:   PHQ-2 Score: 0      Fall Risk Screening: In the past year, patient has experienced: no history of falling in past year      Urinary Incontinence Screening:   Patient has not leaked urine accidently in the last six months  Home Safety:  Patient does not have trouble with stairs inside or outside of their home  Patient has working smoke alarms and has working carbon monoxide detector  Home safety hazards include: none  Nutrition:   Current diet is Regular  Medications:   Patient is currently taking over-the-counter supplements   OTC medications include: see medication list  Patient is able to manage medications  Activities of Daily Living (ADLs)/Instrumental Activities of Daily Living (IADLs):   Walk and transfer into and out of bed and chair?: Yes  Dress and groom yourself?: Yes    Bathe or shower yourself?: Yes    Feed yourself? Yes  Do your laundry/housekeeping?: Yes  Manage your money, pay your bills and track your expenses?: Yes  Make your own meals?: Yes    Do your own shopping?: Yes    Previous Hospitalizations:   Any hospitalizations or ED visits within the last 12 months?: No      Advance Care Planning:   Living will: No    Advanced directive: No    Five wishes given: Yes      Cognitive Screening:   Provider or family/friend/caregiver concerned regarding cognition?: No    PREVENTIVE SCREENINGS      Cardiovascular Screening:    General: Screening Not Indicated and History Lipid Disorder      Diabetes Screening:     General: Screening Not Indicated      Colorectal Cancer Screening:     General: Screening Current      Breast Cancer Screening:     General: Patient Declines      Cervical Cancer Screening:    General: Screening Not Indicated      Osteoporosis Screening:    General: Patient Declines      Abdominal Aortic Aneurysm (AAA) Screening:        General: Screening Not Indicated      Lung Cancer Screening:     General: Screening Not Indicated      Hepatitis C Screening:    General: Screening Current    Hep C Screening Accepted: Yes           Physical Exam:     /74 (BP Location: Left arm, Patient Position: Sitting, Cuff Size: Large)   Pulse 83   Temp 97 5 °F (36 4 °C) (Tympanic)   Resp 16   Ht 5' 1 5" (1 562 m)   Wt 43 5 kg (96 lb)   SpO2 96%   BMI 17 85 kg/m²     Physical Exam   Constitutional: She is oriented to person, place, and time  She appears well-developed and well-nourished  HENT:   Head: Normocephalic  Right Ear: External ear normal    Left Ear: External ear normal    Eyes: Conjunctivae and EOM are normal  Right eye exhibits no discharge  Left eye exhibits no discharge  Neck: No JVD present  Cardiovascular: Normal rate, regular rhythm and normal heart sounds  Exam reveals no gallop  No murmur heard  Pulmonary/Chest: Effort normal  No respiratory distress  She has no wheezes  She has no rales  She exhibits no tenderness  Abdominal: She exhibits no mass  There is no tenderness  There is no rebound  Musculoskeletal: She exhibits no edema or tenderness  Neurological: She is alert and oriented to person, place, and time  Skin: No rash noted  No erythema  Psychiatric: She has a normal mood and affect  Her behavior is normal        Julissa Schafer MD  BMI Counseling: Body mass index is 17 85 kg/m²  The BMI is below normal  Patient was advised to gain weight

## 2020-03-19 NOTE — PATIENT INSTRUCTIONS
Medicare Preventive Visit Patient Instructions  Thank you for completing your Welcome to Medicare Visit or Medicare Annual Wellness Visit today  Your next wellness visit will be due in one year (3/19/2021)  The screening/preventive services that you may require over the next 5-10 years are detailed below  Some tests may not apply to you based off risk factors and/or age  Screening tests ordered at today's visit but not completed yet may show as past due  Also, please note that scanned in results may not display below  Preventive Screenings:  Service Recommendations Previous Testing/Comments   Colorectal Cancer Screening  * Colonoscopy    * Fecal Occult Blood Test (FOBT)/Fecal Immunochemical Test (FIT)  * Fecal DNA/Cologuard Test  * Flexible Sigmoidoscopy Age: 54-65 years old   Colonoscopy: every 10 years (may be performed more frequently if at higher risk)  OR  FOBT/FIT: every 1 year  OR  Cologuard: every 3 years  OR  Sigmoidoscopy: every 5 years  Screening may be recommended earlier than age 48 if at higher risk for colorectal cancer  Also, an individualized decision between you and your healthcare provider will decide whether screening between the ages of 74-80 would be appropriate  Colonoscopy: Not on file  FOBT/FIT: 05/07/2019  Cologuard: Not on file  Sigmoidoscopy: Not on file    Screening Current     Breast Cancer Screening Age: 36 years old  Frequency: every 1-2 years  Not required if history of left and right mastectomy Mammogram: 07/30/2015    Patient Declines   Cervical Cancer Screening Between the ages of 21-29, pap smear recommended once every 3 years  Between the ages of 33-67, can perform pap smear with HPV co-testing every 5 years     Recommendations may differ for women with a history of total hysterectomy, cervical cancer, or abnormal pap smears in past  Pap Smear: 11/20/2019    Screening Not Indicated   Hepatitis C Screening Once for adults born between 1945 and 1965  More frequently in patients at high risk for Hepatitis C Hep C Antibody: 05/18/2019    Screening Current   Diabetes Screening 1-2 times per year if you're at risk for diabetes or have pre-diabetes Fasting glucose: No results in last 5 years   A1C: No results in last 5 years    Screening Not Indicated   Cholesterol Screening Once every 5 years if you don't have a lipid disorder  May order more often based on risk factors  Lipid panel: 02/09/2019    Screening Not Indicated  History Lipid Disorder     Other Preventive Screenings Covered by Medicare:  1  Abdominal Aortic Aneurysm (AAA) Screening: covered once if your at risk  You're considered to be at risk if you have a family history of AAA  2  Lung Cancer Screening: covers low dose CT scan once per year if you meet all of the following conditions: (1) Age 50-69; (2) No signs or symptoms of lung cancer; (3) Current smoker or have quit smoking within the last 15 years; (4) You have a tobacco smoking history of at least 30 pack years (packs per day multiplied by number of years you smoked); (5) You get a written order from a healthcare provider  3  Glaucoma Screening: covered annually if you're considered high risk: (1) You have diabetes OR (2) Family history of glaucoma OR (3)  aged 48 and older OR (3)  American aged 72 and older  3  Osteoporosis Screening: covered every 2 years if you meet one of the following conditions: (1) You're estrogen deficient and at risk for osteoporosis based off medical history and other findings; (2) Have a vertebral abnormality; (3) On glucocorticoid therapy for more than 3 months; (4) Have primary hyperparathyroidism; (5) On osteoporosis medications and need to assess response to drug therapy  · Last bone density test (DXA Scan): Not on file  5  HIV Screening: covered annually if you're between the age of 12-76  Also covered annually if you are younger than 13 and older than 72 with risk factors for HIV infection   For pregnant patients, it is covered up to 3 times per pregnancy  Immunizations:  Immunization Recommendations   Influenza Vaccine Annual influenza vaccination during flu season is recommended for all persons aged >= 6 months who do not have contraindications   Pneumococcal Vaccine (Prevnar and Pneumovax)  * Prevnar = PCV13  * Pneumovax = PPSV23   Adults 25-60 years old: 1-3 doses may be recommended based on certain risk factors  Adults 72 years old: Prevnar (PCV13) vaccine recommended followed by Pneumovax (PPSV23) vaccine  If already received PPSV23 since turning 65, then PCV13 recommended at least one year after PPSV23 dose  Hepatitis B Vaccine 3 dose series if at intermediate or high risk (ex: diabetes, end stage renal disease, liver disease)   Tetanus (Td) Vaccine - COST NOT COVERED BY MEDICARE PART B Following completion of primary series, a booster dose should be given every 10 years to maintain immunity against tetanus  Td may also be given as tetanus wound prophylaxis  Tdap Vaccine - COST NOT COVERED BY MEDICARE PART B Recommended at least once for all adults  For pregnant patients, recommended with each pregnancy  Shingles Vaccine (Shingrix) - COST NOT COVERED BY MEDICARE PART B  2 shot series recommended in those aged 48 and above     Health Maintenance Due:      Topic Date Due    MAMMOGRAM  07/30/2017    CRC Screening: FOBTx3/FIT  05/07/2020    Cervical Cancer Screening  11/20/2022    Hepatitis C Screening  Completed     Immunizations Due:      Topic Date Due    Influenza Vaccine  07/01/2019    Pneumococcal Vaccine: 65+ Years (1 of 2 - PCV13) 01/10/2020     Advance Directives   What are advance directives? Advance directives are legal documents that state your wishes and plans for medical care  These plans are made ahead of time in case you lose your ability to make decisions for yourself   Advance directives can apply to any medical decision, such as the treatments you want, and if you want to donate organs  What are the types of advance directives? There are many types of advance directives, and each state has rules about how to use them  You may choose a combination of any of the following:  · Living will: This is a written record of the treatment you want  You can also choose which treatments you do not want, which to limit, and which to stop at a certain time  This includes surgery, medicine, IV fluid, and tube feedings  · Durable power of  for healthcare [facility]): This is a written record that states who you want to make healthcare choices for you when you are unable to make them for yourself  This person, called a proxy, is usually a family member or a friend  You may choose more than 1 proxy  · Do not resuscitate (DNR) order:  A DNR order is used in case your heart stops beating or you stop breathing  It is a request not to have certain forms of treatment, such as CPR  A DNR order may be included in other types of advance directives  · Medical directive: This covers the care that you want if you are in a coma, near death, or unable to make decisions for yourself  You can list the treatments you want for each condition  Treatment may include pain medicine, surgery, blood transfusions, dialysis, IV or tube feedings, and a ventilator (breathing machine)  · Values history: This document has questions about your views, beliefs, and how you feel and think about life  This information can help others choose the care that you would choose  Why are advance directives important? An advance directive helps you control your care  Although spoken wishes may be used, it is better to have your wishes written down  Spoken wishes can be misunderstood, or not followed  Treatments may be given even if you do not want them  An advance directive may make it easier for your family to make difficult choices about your care     Cigarette Smoking and Your Health   Risks to your health if you smoke:  Nicotine and other chemicals found in tobacco damage every cell in your body  Even if you are a light smoker, you have an increased risk for cancer, heart disease, and lung disease  If you are pregnant or have diabetes, smoking increases your risk for complications  Benefits to your health if you stop smoking:   · You decrease respiratory symptoms such as coughing, wheezing, and shortness of breath  · You reduce your risk for cancers of the lung, mouth, throat, kidney, bladder, pancreas, stomach, and cervix  If you already have cancer, you increase the benefits of chemotherapy  You also reduce your risk for cancer returning or a second cancer from developing  · You reduce your risk for heart disease, blood clots, heart attack, and stroke  · You reduce your risk for lung infections, and diseases such as pneumonia, asthma, chronic bronchitis, and emphysema  · Your circulation improves  More oxygen can be delivered to your body  If you have diabetes, you lower your risk for complications, such as kidney, artery, and eye diseases  You also lower your risk for nerve damage  Nerve damage can lead to amputations, poor vision, and blindness  · You improve your body's ability to heal and to fight infections  For more information and support to stop smoking:   · [employer]  [employer]  Phone: 1- 180 - 261-5986  Web Address: www [employer]  How to Quit Using Smokeless Tobacco   Why it is important to stop using smokeless tobacco:  Smokeless tobacco comes in many forms  Examples include chew, snuff, dip, dissolvable tobacco, and snus  All smokeless tobacco products contain nicotine and may contain as much nicotine as 3 cigarettes  You may be physically dependent on nicotine  You may also be emotionally addicted to it  The cravings can be strong, but it is important to quit using smokeless tobacco  You will improve your health and decrease your cancer, stroke, and heart attack risk   Mouth sores and tooth problems will also improve when you quit  You can benefit from quitting no matter how long you have used smokeless tobacco    Prepare to stop using smokeless tobacco:  Nicotine is a highly addictive drug  Withdrawal symptoms can happen when you stop and make it hard to quit  The following can help keep you on track:  · Set a quit date  · Tell friends, family, and coworkers that you plan to quit  · Remove all smokeless tobacco products from your home, car, and workplace  Manage weight gain after you quit:  Nicotine can affect your metabolism  You may gain a few pounds after you quit  The following can help you control your weight:  · Eat healthy foods  · Drink water before, during, and between meals  · Exercise as directed  Underweight  Underweight is defined as having a body mass index (BMI) of less than 18 5 kg/m2   Anorexia  is a loss of appetite, decreased food intake, or both  Your appetite naturally decreases as you get older  You also get full faster than you used to  This occurs because your body needs less energy  Other body changes can also lead to a decreased appetite  Even though some appetite loss is normal, you still need to get enough calories and nutrients to keep you healthy  You can start to lose too much weight if you do not eat as much food as your body needs  Unwanted weight loss can cause health problems, or worsen health problems you already have  You can also become dehydrated if you do not drink enough liquid  How to eat healthy and get enough nutrients:   · Choose healthy foods  Eat a variety of fruits, vegetables, whole grains, low-fat dairy foods, lean meats, and other protein foods  Limit foods high in fat, sugar, and salt  Limit or avoid alcohol as directed  Work with a dietitian to help you plan your meals if you need to follow a special diet  A dietitian can also teach you how to modify foods if you have trouble chewing or swallowing     · Snack on healthy foods between meals  if you only eat a small amount during meals  Snacks provide extra healthy nutrients and calories between meals  Examples include fruit, cheese, and whole grain crackers  · Drink liquids as directed  to avoid dehydration  Drink liquids between meals if they cause you to get full too quickly during meals  Ask how much liquid to drink each day and which liquids are best for you  · Use herbs, spices, and flavor enhancers to add flavor to foods  Avoid using herbs and spice blends that also contain sodium  Ask your healthcare provider or dietitian about flavor enhancers  Flavor enhancers with ham, natural mera, and roast beef flavors can also be sprinkled on food to add flavor  · Share meals with others as often as you can  Eating with others may help you to eat better during meal time  Ask family members, neighbors, or friends to join you for lunch  There are also senior centers where you can meet people, and share meals with them  · Ask family and friends for help  with shopping or preparing foods  Ask for a ride to the grocery store, if needed  © Copyright [employer] 2018 Information is for End User's use only and may not be sold, redistributed or otherwise used for commercial purposes   All illustrations and images included in CareNotes® are the copyrighted property of [employer] D A M [employer]  or [address] [place] [address] [employer]

## 2020-03-19 NOTE — PROGRESS NOTES
Subjective:   Chief Complaint   Patient presents with    Follow-up     chronic conditions    Medicare Wellness Visit     patient is here for her initial medicare wellness exam         Patient ID: Myesha Walters is a 72 y o  female  Patient here for Medicare will exam and follow-up with a chronic condition patient history of hyperlipidemia try to controlled with the low-fat diet deny any chest pain short of breath no palpitation no TIA symptom patient history of vitamin-D deficiency deny any and a bone pain joint pain no muscle pain no fall patient history of osteopenia deny any bone pain no joint pain patient does have a low BMI and she had the high risk for osteoporosis we discussed the DEXA scan and she decline it recent blood work discussed with the patient show potassium level is elevated patient asymptomatic patient does eat 3-4 banana a day      The following portions of the patient's history were reviewed and updated as appropriate: allergies, current medications, past family history, past medical history, past social history, past surgical history and problem list     Review of Systems   Constitutional: Negative for fatigue and fever  HENT: Negative for ear pain, sinus pressure, sinus pain and sore throat  Eyes: Negative for pain and redness  Respiratory: Negative for cough, chest tightness and shortness of breath  Cardiovascular: Negative for chest pain, palpitations and leg swelling  Gastrointestinal: Negative for abdominal pain, blood in stool, constipation, diarrhea and nausea  Genitourinary: Negative for flank pain, frequency and hematuria  Musculoskeletal: Negative for back pain and joint swelling  Skin: Negative for rash  Neurological: Negative for dizziness, numbness and headaches  Hematological: Does not bruise/bleed easily               Objective:  Vitals:    03/19/20 1540   BP: 110/74   BP Location: Left arm   Patient Position: Sitting   Cuff Size: Large   Pulse: 83   Resp: 16   Temp: 97 5 °F (36 4 °C)   TempSrc: Tympanic   SpO2: 96%   Weight: 43 5 kg (96 lb)   Height: 5' 1 5" (1 562 m)      Physical Exam   Constitutional: She is oriented to person, place, and time  She appears well-developed and well-nourished  HENT:   Head: Normocephalic  Right Ear: External ear normal    Left Ear: External ear normal    Eyes: Conjunctivae and EOM are normal  Right eye exhibits no discharge  Left eye exhibits no discharge  Neck: No JVD present  Cardiovascular: Normal rate, regular rhythm and normal heart sounds  Exam reveals no gallop  No murmur heard  Pulmonary/Chest: Effort normal  No respiratory distress  She has no wheezes  She has no rales  She exhibits no tenderness  Abdominal: She exhibits no mass  There is no tenderness  There is no rebound  Musculoskeletal: She exhibits no edema or tenderness  Neurological: She is alert and oriented to person, place, and time  Skin: No rash noted  No erythema  Assessment/Plan:    Welcome to Medicare preventive visit  Advice and education were given regarding nutrition, aerobic exercises, weight bearing exercises, cardiovascular risk reduction, fall risk reduction, and age appropriate supplements  The patient was counseled regarding instructions for management, risk factor reductions, prognosis, risks and benefits of treatment options, patient and family education, and importance of compliance with treatment           Immunization refused  Patient refuse recommended immunization    Body mass index (BMI) less than 19 in adult  Discussed with the patient important gain weight increase calorie intake    Light cigarette smoker  A chronic uncontrolled patient try to cut down number of the cigarette she is not ready to quit smoking yet patient aware of the complication of smoking    Mixed hyperlipidemia  Chronic asymptomatic discussed with the patient low-fat diet    Vitamin D deficiency  Chronic asymptomatic continue vitamin-D supplement vitamin-D rich diet discussed with the patient    Hyperkalemia  Asymptomatic we discussed the patient low potassium diet will recheck a level in 1 week    Osteopenia  Patient is due for DEXA scan and she decline a to have one       Diagnoses and all orders for this visit:    Welcome to Medicare preventive visit    Body mass index (BMI) less than 19 in adult  -     CBC and differential; Future  -     Basic metabolic panel; Future  -     Lipid Panel with Direct LDL reflex; Future  -     TSH, 3rd generation with Free T4 reflex; Future    Mixed hyperlipidemia  -     CBC and differential; Future  -     Basic metabolic panel; Future  -     Lipid Panel with Direct LDL reflex; Future  -     TSH, 3rd generation with Free T4 reflex; Future    Vitamin D deficiency  -     CBC and differential; Future  -     Basic metabolic panel; Future  -     Lipid Panel with Direct LDL reflex; Future  -     TSH, 3rd generation with Free T4 reflex; Future    Hyperkalemia  -     CBC and differential; Future  -     Basic metabolic panel; Future  -     Lipid Panel with Direct LDL reflex; Future  -     TSH, 3rd generation with Free T4 reflex; Future  -     Potassium;  Future    Light cigarette smoker    Immunization refused    Osteopenia of other site

## 2020-03-20 PROBLEM — Z28.21 IMMUNIZATION REFUSED: Status: ACTIVE | Noted: 2020-03-20

## 2020-03-20 NOTE — ASSESSMENT & PLAN NOTE
A chronic uncontrolled patient try to cut down number of the cigarette she is not ready to quit smoking yet patient aware of the complication of smoking

## 2021-03-04 ENCOUNTER — TELEPHONE (OUTPATIENT)
Dept: FAMILY MEDICINE CLINIC | Facility: CLINIC | Age: 66
End: 2021-03-04

## 2021-04-23 ENCOUNTER — TELEPHONE (OUTPATIENT)
Dept: FAMILY MEDICINE CLINIC | Facility: CLINIC | Age: 66
End: 2021-04-23

## 2021-05-07 ENCOUNTER — VBI (OUTPATIENT)
Dept: ADMINISTRATIVE | Facility: OTHER | Age: 66
End: 2021-05-07

## 2021-06-02 ENCOUNTER — VBI (OUTPATIENT)
Dept: ADMINISTRATIVE | Facility: OTHER | Age: 66
End: 2021-06-02

## 2021-11-29 ENCOUNTER — VBI (OUTPATIENT)
Dept: ADMINISTRATIVE | Facility: OTHER | Age: 66
End: 2021-11-29

## 2023-07-21 NOTE — ASSESSMENT & PLAN NOTE
Chronic asymptomatic continue vitamin-D supplement vitamin-D rich diet discussed with the patient Xray Knee 3 Views, Left